# Patient Record
Sex: FEMALE | Employment: FULL TIME | URBAN - METROPOLITAN AREA
[De-identification: names, ages, dates, MRNs, and addresses within clinical notes are randomized per-mention and may not be internally consistent; named-entity substitution may affect disease eponyms.]

---

## 2017-01-16 ENCOUNTER — HOSPITAL ENCOUNTER (OUTPATIENT)
Facility: MEDICAL CENTER | Age: 42
End: 2017-01-16
Attending: OBSTETRICS & GYNECOLOGY
Payer: COMMERCIAL

## 2017-01-16 LAB
ALBUMIN SERPL BCP-MCNC: 4.5 G/DL (ref 3.2–4.9)
ALBUMIN/GLOB SERPL: 1.7 G/DL
ALP SERPL-CCNC: 35 U/L (ref 30–99)
ALT SERPL-CCNC: 17 U/L (ref 2–50)
ANION GAP SERPL CALC-SCNC: 7 MMOL/L (ref 0–11.9)
AST SERPL-CCNC: 16 U/L (ref 12–45)
BILIRUB SERPL-MCNC: 0.7 MG/DL (ref 0.1–1.5)
BUN SERPL-MCNC: 12 MG/DL (ref 8–22)
CALCIUM SERPL-MCNC: 9.2 MG/DL (ref 8.5–10.5)
CHLORIDE SERPL-SCNC: 105 MMOL/L (ref 96–112)
CHOLEST SERPL-MCNC: 186 MG/DL (ref 100–199)
CO2 SERPL-SCNC: 26 MMOL/L (ref 20–33)
CREAT SERPL-MCNC: 0.81 MG/DL (ref 0.5–1.4)
GLOBULIN SER CALC-MCNC: 2.7 G/DL (ref 1.9–3.5)
GLUCOSE SERPL-MCNC: 89 MG/DL (ref 65–99)
HDLC SERPL-MCNC: 79 MG/DL
LDLC SERPL CALC-MCNC: 90 MG/DL
POTASSIUM SERPL-SCNC: 3.8 MMOL/L (ref 3.6–5.5)
PROLACTIN SERPL-MCNC: 17.55 NG/ML (ref 2.8–26)
PROT SERPL-MCNC: 7.2 G/DL (ref 6–8.2)
SODIUM SERPL-SCNC: 138 MMOL/L (ref 135–145)
TRIGL SERPL-MCNC: 83 MG/DL (ref 0–149)
TSH SERPL DL<=0.005 MIU/L-ACNC: 4.72 UIU/ML (ref 0.3–3.7)

## 2017-01-16 PROCEDURE — 80053 COMPREHEN METABOLIC PANEL: CPT

## 2017-01-16 PROCEDURE — 80061 LIPID PANEL: CPT

## 2017-01-16 PROCEDURE — 84443 ASSAY THYROID STIM HORMONE: CPT

## 2017-01-16 PROCEDURE — 84146 ASSAY OF PROLACTIN: CPT

## 2017-02-01 ENCOUNTER — HOSPITAL ENCOUNTER (OUTPATIENT)
Facility: MEDICAL CENTER | Age: 42
End: 2017-02-01
Attending: NURSE PRACTITIONER
Payer: COMMERCIAL

## 2017-02-01 PROCEDURE — 87624 HPV HI-RISK TYP POOLED RSLT: CPT

## 2017-02-01 PROCEDURE — 88175 CYTOPATH C/V AUTO FLUID REDO: CPT

## 2017-02-02 PROCEDURE — 87624 HPV HI-RISK TYP POOLED RSLT: CPT

## 2017-02-03 LAB
CYTOLOGY REG CYTOL: ABNORMAL
HPV HR 12 DNA CVX QL NAA+PROBE: POSITIVE
HPV16 DNA SPEC QL NAA+PROBE: NEGATIVE
HPV18 DNA SPEC QL NAA+PROBE: NEGATIVE
SPECIMEN SOURCE: ABNORMAL

## 2017-09-08 ENCOUNTER — HOSPITAL ENCOUNTER (OUTPATIENT)
Dept: LAB | Facility: MEDICAL CENTER | Age: 42
End: 2017-09-08
Attending: OBSTETRICS & GYNECOLOGY
Payer: COMMERCIAL

## 2017-09-08 PROCEDURE — 83520 IMMUNOASSAY QUANT NOS NONAB: CPT

## 2017-09-11 LAB — MIS SERPL-MCNC: 0.3 NG/ML (ref 0.18–11.71)

## 2017-11-01 ENCOUNTER — HOSPITAL ENCOUNTER (OUTPATIENT)
Dept: LAB | Facility: MEDICAL CENTER | Age: 42
End: 2017-11-01
Attending: OBSTETRICS & GYNECOLOGY
Payer: COMMERCIAL

## 2017-11-01 LAB
HBV SURFACE AB SERPL IA-ACNC: 373.94 MIU/ML (ref 0–10)
HBV SURFACE AG SER QL: NEGATIVE
HCV AB SER QL: NEGATIVE
HIV 1+2 AB+HIV1 P24 AG SERPL QL IA: NON REACTIVE
TREPONEMA PALLIDUM IGG+IGM AB [PRESENCE] IN SERUM OR PLASMA BY IMMUNOASSAY: NON REACTIVE
TSH SERPL DL<=0.005 MIU/L-ACNC: 1.9 UIU/ML (ref 0.3–3.7)

## 2017-11-01 PROCEDURE — 87340 HEPATITIS B SURFACE AG IA: CPT

## 2017-11-01 PROCEDURE — 87389 HIV-1 AG W/HIV-1&-2 AB AG IA: CPT

## 2017-11-01 PROCEDURE — 87591 N.GONORRHOEAE DNA AMP PROB: CPT

## 2017-11-01 PROCEDURE — 84443 ASSAY THYROID STIM HORMONE: CPT

## 2017-11-01 PROCEDURE — 86803 HEPATITIS C AB TEST: CPT

## 2017-11-01 PROCEDURE — 87491 CHLMYD TRACH DNA AMP PROBE: CPT

## 2017-11-01 PROCEDURE — 86780 TREPONEMA PALLIDUM: CPT

## 2017-11-01 PROCEDURE — 86706 HEP B SURFACE ANTIBODY: CPT

## 2017-11-02 LAB
C TRACH DNA SPEC QL NAA+PROBE: NEGATIVE
N GONORRHOEA DNA SPEC QL NAA+PROBE: NEGATIVE
SPECIMEN SOURCE: NORMAL

## 2018-06-20 ENCOUNTER — HOSPITAL ENCOUNTER (OUTPATIENT)
Dept: LAB | Facility: MEDICAL CENTER | Age: 43
End: 2018-06-20
Attending: NURSE PRACTITIONER
Payer: COMMERCIAL

## 2018-06-20 PROCEDURE — 87591 N.GONORRHOEAE DNA AMP PROB: CPT

## 2018-06-20 PROCEDURE — 87624 HPV HI-RISK TYP POOLED RSLT: CPT

## 2018-06-20 PROCEDURE — 87491 CHLMYD TRACH DNA AMP PROBE: CPT

## 2018-06-20 PROCEDURE — 88175 CYTOPATH C/V AUTO FLUID REDO: CPT

## 2018-06-22 LAB — AMBIGUOUS DTTM AMBI4: NORMAL

## 2018-06-26 LAB
C TRACH DNA GENITAL QL NAA+PROBE: NEGATIVE
CYTOLOGY REG CYTOL: ABNORMAL
HPV HR 12 DNA CVX QL NAA+PROBE: POSITIVE
HPV16 DNA SPEC QL NAA+PROBE: NEGATIVE
HPV18 DNA SPEC QL NAA+PROBE: NEGATIVE
N GONORRHOEA DNA GENITAL QL NAA+PROBE: NEGATIVE
SPECIMEN SOURCE: ABNORMAL
SPECIMEN SOURCE: ABNORMAL

## 2018-09-12 ENCOUNTER — HOSPITAL ENCOUNTER (OUTPATIENT)
Dept: RADIOLOGY | Facility: MEDICAL CENTER | Age: 43
End: 2018-09-12
Attending: NURSE PRACTITIONER
Payer: COMMERCIAL

## 2018-09-12 DIAGNOSIS — Z12.31 BREAST CANCER SCREENING BY MAMMOGRAM: ICD-10-CM

## 2018-09-12 PROCEDURE — 77067 SCR MAMMO BI INCL CAD: CPT

## 2019-10-23 ENCOUNTER — HOSPITAL ENCOUNTER (OUTPATIENT)
Dept: RADIOLOGY | Facility: MEDICAL CENTER | Age: 44
End: 2019-10-23
Attending: NURSE PRACTITIONER
Payer: COMMERCIAL

## 2019-10-23 DIAGNOSIS — Z12.39 BREAST SCREENING: ICD-10-CM

## 2019-10-23 PROCEDURE — 77063 BREAST TOMOSYNTHESIS BI: CPT

## 2020-06-15 ENCOUNTER — APPOINTMENT (RX ONLY)
Dept: URBAN - METROPOLITAN AREA CLINIC 20 | Facility: CLINIC | Age: 45
Setting detail: DERMATOLOGY
End: 2020-06-15

## 2020-06-15 DIAGNOSIS — Z41.9 ENCOUNTER FOR PROCEDURE FOR PURPOSES OTHER THAN REMEDYING HEALTH STATE, UNSPECIFIED: ICD-10-CM

## 2020-06-15 PROCEDURE — ? SCITON BBL

## 2020-06-15 ASSESSMENT — LOCATION DETAILED DESCRIPTION DERM
LOCATION DETAILED: LEFT FOREHEAD
LOCATION DETAILED: LEFT INFERIOR CENTRAL MALAR CHEEK

## 2020-06-15 ASSESSMENT — LOCATION ZONE DERM: LOCATION ZONE: FACE

## 2020-06-15 ASSESSMENT — LOCATION SIMPLE DESCRIPTION DERM
LOCATION SIMPLE: LEFT FOREHEAD
LOCATION SIMPLE: LEFT CHEEK

## 2020-06-15 NOTE — PROCEDURE: SCITON BBL
Post Procedure Text: The patient tolerated the procedure well. Ice-chilled washclothes were applied to the treatment area for comfort. Post care was reviewed with the patient.
Repetition Rate (Hz): 2
Pulse Duration Units: milliseconds
Hide Repetition Rate?: No
Cooling (In C): 15
Fluence (J/Cm2): 12
Repetition Rate (Hz): 3
Cooling (In C): 20
Pulse Duration: 10
Detail Level: Zone
Fluence (J/Cm2): 8
Spot Size: Finesse Adapter Size: 15 x 15 mm square
Passes: 1
Spot Size: Finesse Adapter Size: 15 x 45 mm (No Finesse Adapter)
Cooling (In C): 22
Fluence (J/Cm2): 17
Price (Use Numbers Only, No Special Characters Or $): 350.00
Fluence (J/Cm2): 21
Consent: Written consent obtained, risks reviewed including but not limited to crusting, scabbing, blistering, scarring, darker or lighter pigmentary change, bruising, and/or incomplete response.
Anesthesia Volume In Cc: 0
Cooling ?: Yes
Fluence (J/Cm2): 9
Cooling (In C): 25
Post-Care Instructions: I reviewed with the patient in detail post-care instructions. Patient should stay away from the sun and wear sun protection until treated areas are fully healed.
Preprocedure Text: The treatment areas were thoroughly cleaned. Any exposed at risk hair that was not to be treated was covered in white paper tape. Clear ultrasound gel was applied to the treatment area. The area was treated with no immediate stacking of pulses.

## 2022-03-10 ENCOUNTER — APPOINTMENT (RX ONLY)
Dept: URBAN - METROPOLITAN AREA CLINIC 4 | Facility: CLINIC | Age: 47
Setting detail: DERMATOLOGY
End: 2022-03-10

## 2022-03-10 DIAGNOSIS — L81.4 OTHER MELANIN HYPERPIGMENTATION: ICD-10-CM

## 2022-03-10 DIAGNOSIS — L82.1 OTHER SEBORRHEIC KERATOSIS: ICD-10-CM

## 2022-03-10 DIAGNOSIS — L91.8 OTHER HYPERTROPHIC DISORDERS OF THE SKIN: ICD-10-CM

## 2022-03-10 DIAGNOSIS — D18.0 HEMANGIOMA: ICD-10-CM

## 2022-03-10 DIAGNOSIS — D22 MELANOCYTIC NEVI: ICD-10-CM

## 2022-03-10 DIAGNOSIS — R22.9 LOCALIZED SWELLING, MASS AND LUMP, UNSPECIFIED: ICD-10-CM

## 2022-03-10 DIAGNOSIS — Z71.89 OTHER SPECIFIED COUNSELING: ICD-10-CM

## 2022-03-10 PROBLEM — D22.72 MELANOCYTIC NEVI OF LEFT LOWER LIMB, INCLUDING HIP: Status: ACTIVE | Noted: 2022-03-10

## 2022-03-10 PROBLEM — D22.61 MELANOCYTIC NEVI OF RIGHT UPPER LIMB, INCLUDING SHOULDER: Status: ACTIVE | Noted: 2022-03-10

## 2022-03-10 PROBLEM — D22.62 MELANOCYTIC NEVI OF LEFT UPPER LIMB, INCLUDING SHOULDER: Status: ACTIVE | Noted: 2022-03-10

## 2022-03-10 PROBLEM — D22.5 MELANOCYTIC NEVI OF TRUNK: Status: ACTIVE | Noted: 2022-03-10

## 2022-03-10 PROBLEM — D18.01 HEMANGIOMA OF SKIN AND SUBCUTANEOUS TISSUE: Status: ACTIVE | Noted: 2022-03-10

## 2022-03-10 PROBLEM — D22.71 MELANOCYTIC NEVI OF RIGHT LOWER LIMB, INCLUDING HIP: Status: ACTIVE | Noted: 2022-03-10

## 2022-03-10 PROCEDURE — 99213 OFFICE O/P EST LOW 20 MIN: CPT

## 2022-03-10 PROCEDURE — ? COUNSELING

## 2022-03-10 ASSESSMENT — LOCATION ZONE DERM
LOCATION ZONE: EYELID
LOCATION ZONE: FACE
LOCATION ZONE: ARM
LOCATION ZONE: TRUNK
LOCATION ZONE: LEG

## 2022-03-10 ASSESSMENT — LOCATION DETAILED DESCRIPTION DERM
LOCATION DETAILED: INFERIOR THORACIC SPINE
LOCATION DETAILED: RIGHT ANTERIOR PROXIMAL UPPER ARM
LOCATION DETAILED: LEFT POPLITEAL SKIN
LOCATION DETAILED: LEFT SUPERIOR MEDIAL MIDBACK
LOCATION DETAILED: RIGHT VENTRAL PROXIMAL FOREARM
LOCATION DETAILED: LEFT ANTERIOR PROXIMAL UPPER ARM
LOCATION DETAILED: RIGHT BUTTOCK
LOCATION DETAILED: XIPHOID
LOCATION DETAILED: RIGHT LATERAL INFERIOR EYELID
LOCATION DETAILED: LEFT INFERIOR LATERAL MIDBACK
LOCATION DETAILED: RIGHT SUPERIOR MEDIAL MALAR CHEEK
LOCATION DETAILED: LEFT ANTERIOR DISTAL UPPER ARM
LOCATION DETAILED: LEFT BUTTOCK
LOCATION DETAILED: RIGHT ANTERIOR DISTAL UPPER ARM
LOCATION DETAILED: LEFT DISTAL POSTERIOR THIGH
LOCATION DETAILED: LEFT VENTRAL PROXIMAL FOREARM
LOCATION DETAILED: LEFT MEDIAL BREAST 7-8:00 REGION
LOCATION DETAILED: LEFT INFERIOR MEDIAL UPPER BACK
LOCATION DETAILED: LEFT VENTRAL LATERAL PROXIMAL FOREARM
LOCATION DETAILED: RIGHT MEDIAL SUPERIOR CHEST
LOCATION DETAILED: RIGHT PROXIMAL PRETIBIAL REGION
LOCATION DETAILED: RIGHT LATERAL BREAST 8-9:00 REGION
LOCATION DETAILED: RIGHT INFERIOR CENTRAL MALAR CHEEK
LOCATION DETAILED: LEFT LATERAL PROXIMAL PRETIBIAL REGION
LOCATION DETAILED: SUBXIPHOID
LOCATION DETAILED: RIGHT POPLITEAL SKIN
LOCATION DETAILED: LEFT PROXIMAL PRETIBIAL REGION
LOCATION DETAILED: RIGHT DISTAL POSTERIOR THIGH
LOCATION DETAILED: RIGHT ANTECUBITAL SKIN

## 2022-03-10 ASSESSMENT — LOCATION SIMPLE DESCRIPTION DERM
LOCATION SIMPLE: RIGHT PRETIBIAL REGION
LOCATION SIMPLE: LEFT BREAST
LOCATION SIMPLE: LEFT PRETIBIAL REGION
LOCATION SIMPLE: LEFT LOWER BACK
LOCATION SIMPLE: UPPER BACK
LOCATION SIMPLE: RIGHT BREAST
LOCATION SIMPLE: ABDOMEN
LOCATION SIMPLE: CHEST
LOCATION SIMPLE: RIGHT POSTERIOR THIGH
LOCATION SIMPLE: RIGHT POPLITEAL SKIN
LOCATION SIMPLE: LEFT FOREARM
LOCATION SIMPLE: RIGHT BUTTOCK
LOCATION SIMPLE: LEFT POSTERIOR THIGH
LOCATION SIMPLE: RIGHT CHEEK
LOCATION SIMPLE: LEFT BUTTOCK
LOCATION SIMPLE: LEFT UPPER ARM
LOCATION SIMPLE: RIGHT INFERIOR EYELID
LOCATION SIMPLE: RIGHT FOREARM
LOCATION SIMPLE: LEFT POPLITEAL SKIN
LOCATION SIMPLE: RIGHT UPPER ARM
LOCATION SIMPLE: LEFT UPPER BACK

## 2022-03-10 NOTE — PROCEDURE: COUNSELING
Detail Level: Zone
Patient Specific Counseling (Will Not Stick From Patient To Patient): Recommend lasers especially on the chest.
Detail Level: Detailed
Patient Specific Counseling (Will Not Stick From Patient To Patient): Recommend lasers.

## 2024-02-15 ENCOUNTER — APPOINTMENT (RX ONLY)
Dept: URBAN - METROPOLITAN AREA CLINIC 4 | Facility: CLINIC | Age: 49
Setting detail: DERMATOLOGY
End: 2024-02-15

## 2024-02-15 DIAGNOSIS — D485 NEOPLASM OF UNCERTAIN BEHAVIOR OF SKIN: ICD-10-CM

## 2024-02-15 DIAGNOSIS — L82.0 INFLAMED SEBORRHEIC KERATOSIS: ICD-10-CM

## 2024-02-15 PROBLEM — D48.5 NEOPLASM OF UNCERTAIN BEHAVIOR OF SKIN: Status: ACTIVE | Noted: 2024-02-15

## 2024-02-15 PROCEDURE — ? DIAGNOSIS COMMENT

## 2024-02-15 PROCEDURE — 17110 DESTRUCTION B9 LES UP TO 14: CPT

## 2024-02-15 PROCEDURE — ? COUNSELING

## 2024-02-15 PROCEDURE — 99212 OFFICE O/P EST SF 10 MIN: CPT | Mod: 25

## 2024-02-15 PROCEDURE — ? LIQUID NITROGEN

## 2024-02-15 ASSESSMENT — LOCATION DETAILED DESCRIPTION DERM
LOCATION DETAILED: LEFT SUPERIOR FOREHEAD
LOCATION DETAILED: RIGHT INFERIOR LID MARGIN

## 2024-02-15 ASSESSMENT — LOCATION ZONE DERM
LOCATION ZONE: EYELID
LOCATION ZONE: FACE

## 2024-02-15 ASSESSMENT — LOCATION SIMPLE DESCRIPTION DERM
LOCATION SIMPLE: RIGHT INFERIOR EYELID
LOCATION SIMPLE: LEFT FOREHEAD

## 2024-02-15 NOTE — PROCEDURE: DIAGNOSIS COMMENT
Render Risk Assessment In Note?: no
Comment: Referring to Dr. Ann for evaluation and management, possibly removal
Detail Level: Simple

## 2024-02-15 NOTE — PROCEDURE: LIQUID NITROGEN
Spray Paint Technique: No
Spray Paint Text: The liquid nitrogen was applied to the skin utilizing a spray paint frosting technique.
Medical Necessity Information: It is in your best interest to select a reason for this procedure from the list below. All of these items fulfill various CMS LCD requirements except the new and changing color options.
Show Topical Anesthesia Variable?: Yes
Consent: The patient's consent was obtained including but not limited to risks of crusting, scabbing, blistering, scarring, darker or lighter pigmentary change, recurrence, incomplete removal and infection.
Medical Necessity Clause: This procedure was medically necessary because the lesions that were treated were:
Detail Level: Detailed
Post-Care Instructions: I reviewed with the patient in detail post-care instructions. Patient is to wear sunprotection, and avoid picking at any of the treated lesions. Pt may apply Vaseline to crusted or scabbing areas.

## 2024-02-22 ENCOUNTER — OFFICE VISIT (OUTPATIENT)
Dept: URGENT CARE | Facility: CLINIC | Age: 49
End: 2024-02-22
Payer: COMMERCIAL

## 2024-02-22 ENCOUNTER — APPOINTMENT (OUTPATIENT)
Dept: RADIOLOGY | Facility: IMAGING CENTER | Age: 49
End: 2024-02-22
Attending: NURSE PRACTITIONER
Payer: COMMERCIAL

## 2024-02-22 VITALS
HEART RATE: 85 BPM | HEIGHT: 67 IN | OXYGEN SATURATION: 99 % | SYSTOLIC BLOOD PRESSURE: 108 MMHG | BODY MASS INDEX: 20.4 KG/M2 | DIASTOLIC BLOOD PRESSURE: 70 MMHG | RESPIRATION RATE: 16 BRPM | WEIGHT: 130 LBS | TEMPERATURE: 97.4 F

## 2024-02-22 DIAGNOSIS — R00.2 PALPITATIONS: ICD-10-CM

## 2024-02-22 DIAGNOSIS — I49.8 SINUS ARRHYTHMIA SEEN ON ELECTROCARDIOGRAM: ICD-10-CM

## 2024-02-22 PROCEDURE — 3078F DIAST BP <80 MM HG: CPT | Performed by: NURSE PRACTITIONER

## 2024-02-22 PROCEDURE — 3074F SYST BP LT 130 MM HG: CPT | Performed by: NURSE PRACTITIONER

## 2024-02-22 PROCEDURE — 99204 OFFICE O/P NEW MOD 45 MIN: CPT | Performed by: NURSE PRACTITIONER

## 2024-02-22 PROCEDURE — 71046 X-RAY EXAM CHEST 2 VIEWS: CPT | Mod: TC | Performed by: NURSE PRACTITIONER

## 2024-02-22 RX ORDER — VALACYCLOVIR HYDROCHLORIDE 1 G/1
TABLET, FILM COATED ORAL
COMMUNITY
Start: 2023-12-18

## 2024-02-22 RX ORDER — FLUCONAZOLE 150 MG/1
TABLET ORAL
COMMUNITY
Start: 2023-12-18 | End: 2024-03-01

## 2024-02-22 RX ORDER — DEXTROAMPHETAMINE SACCHARATE, AMPHETAMINE ASPARTATE, DEXTROAMPHETAMINE SULFATE AND AMPHETAMINE SULFATE 2.5; 2.5; 2.5; 2.5 MG/1; MG/1; MG/1; MG/1
10 TABLET ORAL 3 TIMES DAILY
COMMUNITY
Start: 2024-02-09

## 2024-02-22 ASSESSMENT — ENCOUNTER SYMPTOMS
SHORTNESS OF BREATH: 0
DIZZINESS: 0
PALPITATIONS: 1
NERVOUS/ANXIOUS: 0

## 2024-02-22 NOTE — PATIENT INSTRUCTIONS
Follow up with PCP. Follow up emergently for increased or persistent chest pain, persistent palpitations, dizziness, syncope, weakness, or nausea or vomiting.

## 2024-02-22 NOTE — PROGRESS NOTES
Subjective:     Mirella West is a 48 y.o. female who presents for Palpitations (X2 months)      Presents for new onset  palpitations, which have not notable correlated to anything specific. States they started a couple months ago. Symptoms are brief. Denies taking any new stimulants. No changes in caffeine intake. No diet medications. Has been on Adderall for several years, stating she takes it PRN. No reported cardiovascular risk factors. No hx of HTN. Her TSH was reportedly WNL last year.     Palpitations   This is a new problem. The current episode started more than 1 month ago. Nothing aggravates the symptoms. Pertinent negatives include no anxiety, dizziness or shortness of breath. She has tried nothing for the symptoms. There is no history of anemia, anxiety, heart disease or hyperthyroidism.       No past medical history on file.    Past Surgical History:   Procedure Laterality Date    PB ENLARGE BREAST WITH IMPLANT         Social History     Socioeconomic History    Marital status: Unknown     Spouse name: Not on file    Number of children: Not on file    Years of education: Not on file    Highest education level: Not on file   Occupational History    Not on file   Tobacco Use    Smoking status: Not on file    Smokeless tobacco: Not on file   Substance and Sexual Activity    Alcohol use: Not on file    Drug use: Not on file    Sexual activity: Not on file   Other Topics Concern    Not on file   Social History Narrative    Not on file     Social Determinants of Health     Financial Resource Strain: Not on file   Food Insecurity: Not on file   Transportation Needs: Not on file   Physical Activity: Not on file   Stress: Not on file   Social Connections: Not on file   Intimate Partner Violence: Not on file   Housing Stability: Not on file        No family history on file.     No Known Allergies    Review of Systems   Respiratory:  Negative for shortness of breath.    Cardiovascular:  Positive for  "palpitations. Negative for leg swelling.   Neurological:  Negative for dizziness.   Psychiatric/Behavioral:  The patient is not nervous/anxious.    All other systems reviewed and are negative.       Objective:   /70 (BP Location: Left arm, Patient Position: Sitting, BP Cuff Size: Adult)   Pulse 85   Temp 36.3 °C (97.4 °F) (Temporal)   Resp 16   Ht 1.702 m (5' 7\")   Wt 59 kg (130 lb)   SpO2 99%   BMI 20.36 kg/m²     Physical Exam  Vitals reviewed.   Constitutional:       General: She is not in acute distress.     Appearance: She is not toxic-appearing.   Cardiovascular:      Rate and Rhythm: Normal rate. Rhythm irregular.   Pulmonary:      Effort: Pulmonary effort is normal. No respiratory distress.      Breath sounds: Normal breath sounds. No stridor. No wheezing, rhonchi or rales.   Skin:     General: Skin is warm and dry.   Neurological:      Mental Status: She is alert and oriented to person, place, and time.         Assessment/Plan:   1. Palpitations  - EKG - Clinic Performed  - TSH; Future  - CBC WITHOUT DIFFERENTIAL; Future  - Comp Metabolic Panel; Future  - MAGNESIUM; Future  - DX-CHEST-2 VIEWS; Future  - REFERRAL TO CARDIOLOGY    2. Sinus arrhythmia seen on electrocardiogram  - TSH; Future  - CBC WITHOUT DIFFERENTIAL; Future  - Comp Metabolic Panel; Future  - MAGNESIUM; Future  - DX-CHEST-2 VIEWS; Future  - REFERRAL TO CARDIOLOGY  DX-CHEST-2 VIEWS    Result Date: 2/22/2024 2/22/2024 1:25 PM HISTORY/REASON FOR EXAM: . Palpitations TECHNIQUE/EXAM DESCRIPTION AND NUMBER OF VIEWS: Two views of the chest. COMPARISON:  None. FINDINGS: The heart is normal in size. No pulmonary infiltrates or consolidations are noted. No pleural effusions are appreciated.     No active disease.    Follow up with PCP. Follow up emergently for increased or persistent chest pain, persistent palpitations, dizziness, syncope, weakness, or nausea or vomiting.    -Takes Adderall PRN, denies changes in medication dosage. Has " tolerated the medication previously. Low risk for ACS based on risk assessment and symptoms. No ST elevation. Advised initiating baseline labs, with cardiology follow up. The patient does have an established PCP. Given ED precautions for new or worsening symptoms.     Differential diagnosis, natural history, supportive care, and indications for immediate follow-up discussed.

## 2024-03-01 ENCOUNTER — OFFICE VISIT (OUTPATIENT)
Dept: CARDIOLOGY | Facility: MEDICAL CENTER | Age: 49
End: 2024-03-01
Attending: INTERNAL MEDICINE
Payer: COMMERCIAL

## 2024-03-01 VITALS
SYSTOLIC BLOOD PRESSURE: 110 MMHG | HEART RATE: 73 BPM | RESPIRATION RATE: 18 BRPM | DIASTOLIC BLOOD PRESSURE: 80 MMHG | WEIGHT: 128 LBS | OXYGEN SATURATION: 99 % | BODY MASS INDEX: 20.09 KG/M2 | HEIGHT: 67 IN

## 2024-03-01 DIAGNOSIS — I49.3 PVC'S (PREMATURE VENTRICULAR CONTRACTIONS): ICD-10-CM

## 2024-03-01 LAB — AMBIG TEST ORDER  977174: NORMAL

## 2024-03-01 PROCEDURE — 3074F SYST BP LT 130 MM HG: CPT | Performed by: INTERNAL MEDICINE

## 2024-03-01 PROCEDURE — 3079F DIAST BP 80-89 MM HG: CPT | Performed by: INTERNAL MEDICINE

## 2024-03-01 PROCEDURE — 99204 OFFICE O/P NEW MOD 45 MIN: CPT | Performed by: INTERNAL MEDICINE

## 2024-03-01 PROCEDURE — 99202 OFFICE O/P NEW SF 15 MIN: CPT | Performed by: INTERNAL MEDICINE

## 2024-03-01 ASSESSMENT — FIBROSIS 4 INDEX: FIB4 SCORE: 0.96

## 2024-03-01 NOTE — PROGRESS NOTES
"    Interventional cardiology Initial Consultation Note      Chief Complaint: Palpitations    Mirella West is a 48 y.o. female  patient presented today with complaints of palpitations feels like thumping, skipping a beat.  No chest pain, shortness of breath.  But she can feel them sometimes all day, disruptive to her day-to-day activities.        History reviewed. No pertinent past medical history.          Current Outpatient Medications   Medication Sig Dispense Refill    metoprolol tartrate (LOPRESSOR) 25 MG Tab Take 0.5 Tablets by mouth 2 times a day. 60 Tablet 5    amphetamine-dextroamphetamine (ADDERALL) 10 MG Tab Take 10 mg by mouth 3 times a day.      valacyclovir (VALTREX) 1 GM Tab       fluconazole (DIFLUCAN) 150 MG tablet  (Patient not taking: Reported on 3/1/2024)       No current facility-administered medications for this visit.             Physical Exam:  Ambulatory Vitals  /80 (BP Location: Left arm, Patient Position: Sitting, BP Cuff Size: Adult)   Pulse 73   Resp 18   Ht 1.702 m (5' 7\")   Wt 58.1 kg (128 lb)   SpO2 99%    Oxygen Therapy:  Pulse Oximetry: 99 %  BP Readings from Last 4 Encounters:   03/01/24 110/80   02/22/24 108/70       Weight/BMI: Body mass index is 20.05 kg/m².  Wt Readings from Last 4 Encounters:   03/01/24 58.1 kg (128 lb)   02/22/24 59 kg (130 lb)           General: Well appearing and in no apparent distress  Neck: carotid bruits absent  Lungs: respiratory sounds  normal  Heart: Regular rhythm,  No palpable thrills on palpation, murmurs absent, no rubs,   Lower extremity edema absent.     EKG sinus rhythm, PVC      Medical Decision Making:  Problem List Items Addressed This Visit    None  Visit Diagnoses       PVC's (premature ventricular contractions)        Relevant Medications    metoprolol tartrate (LOPRESSOR) 25 MG Tab    Other Relevant Orders    EC-ECHOCARDIOGRAM COMPLETE W/O CONT          48-year-old female patient with complaints of palpitations due to " PVCs.  Conservative measures discussed like hydration, avoiding coffee.  Will try beta-blocker metoprolol 12.5 mg twice daily.  Will obtain an echocardiogram to rule out structural heart disease.        This note was dictated using Dragon speech recognition software.    Donnie AMES  Interventional cardiologist  [facility] [address]  [address] [address]  Phone: [phone]  Fax: [phone]

## 2024-03-05 LAB
CA-I SERPL ISE-MCNC: NORMAL MG/DL
REQUEST PROBLEM   100875: NORMAL
WRITTEN AUTHORIZATION   977900: NORMAL

## 2024-03-06 LAB
ALBUMIN SERPL-MCNC: 4.8 G/DL (ref 3.9–4.9)
ALBUMIN/GLOB SERPL: 1.8 {RATIO} (ref 1.2–2.2)
ALP SERPL-CCNC: 55 IU/L (ref 44–121)
ALT SERPL-CCNC: 17 IU/L (ref 0–32)
AST SERPL-CCNC: 22 IU/L (ref 0–40)
BILIRUB SERPL-MCNC: 0.5 MG/DL (ref 0–1.2)
BUN SERPL-MCNC: 13 MG/DL (ref 6–24)
BUN/CREAT SERPL: 17 (ref 9–23)
CALCIUM SERPL-MCNC: 9.8 MG/DL (ref 8.7–10.2)
CHLORIDE SERPL-SCNC: 102 MMOL/L (ref 96–106)
CHOLEST SERPL-MCNC: 212 MG/DL (ref 100–199)
CO2 SERPL-SCNC: 17 MMOL/L (ref 20–29)
CREAT SERPL-MCNC: 0.77 MG/DL (ref 0.57–1)
EGFRCR SERPLBLD CKD-EPI 2021: 96 ML/MIN/1.73
ERYTHROCYTE [DISTWIDTH] IN BLOOD BY AUTOMATED COUNT: 12 % (ref 11.7–15.4)
GLOBULIN SER CALC-MCNC: 2.6 G/DL (ref 1.5–4.5)
GLUCOSE SERPL-MCNC: ABNORMAL MG/DL
HCT VFR BLD AUTO: 43.3 % (ref 34–46.6)
HDLC SERPL-MCNC: 91 MG/DL
HGB BLD-MCNC: 14.2 G/DL (ref 11.1–15.9)
LDLC SERPL CALC-MCNC: 106 MG/DL (ref 0–99)
MAGNESIUM SERPL-MCNC: 2.1 MG/DL (ref 1.6–2.3)
MCH RBC QN AUTO: 33.2 PG (ref 26.6–33)
MCHC RBC AUTO-ENTMCNC: 32.8 G/DL (ref 31.5–35.7)
MCV RBC AUTO: 101 FL (ref 79–97)
MIS SERPL-MCNC: 0.25 NG/ML
PLATELET # BLD AUTO: 256 X10E3/UL (ref 150–450)
PLEASE NOTE   199999: NORMAL
POTASSIUM SERPL-SCNC: ABNORMAL MMOL/L
PROT SERPL-MCNC: 7.4 G/DL (ref 6–8.5)
RBC # BLD AUTO: 4.28 X10E6/UL (ref 3.77–5.28)
SODIUM SERPL-SCNC: 138 MMOL/L (ref 134–144)
TRIGL SERPL-MCNC: 84 MG/DL (ref 0–149)
TSH SERPL DL<=0.005 MIU/L-ACNC: 3.11 UIU/ML (ref 0.45–4.5)
VLDLC SERPL CALC-MCNC: 15 MG/DL (ref 5–40)
WBC # BLD AUTO: 4.8 X10E3/UL (ref 3.4–10.8)

## 2024-03-21 ENCOUNTER — PATIENT MESSAGE (OUTPATIENT)
Dept: CARDIOLOGY | Facility: MEDICAL CENTER | Age: 49
End: 2024-03-21
Payer: COMMERCIAL

## 2024-03-21 ENCOUNTER — TELEPHONE (OUTPATIENT)
Dept: CARDIOLOGY | Facility: MEDICAL CENTER | Age: 49
End: 2024-03-21
Payer: COMMERCIAL

## 2024-03-21 DIAGNOSIS — I49.3 PVC'S (PREMATURE VENTRICULAR CONTRACTIONS): ICD-10-CM

## 2024-03-21 NOTE — PATIENT COMMUNICATION
Donnie Snow M.D.  Zach now (4:07 PM)     Schedule earlier appointment for heart monitor.       To schedulers: Please reach out to schedule visit for heart monitor, thank you!

## 2024-03-21 NOTE — TELEPHONE ENCOUNTER
To AK: Patient called requesting a cardiac monitor. Not mentioned in recent office visit. Please advise if okay to order. Thank you!

## 2024-03-21 NOTE — PATIENT COMMUNICATION
To AK: Please see patient's message following up on the request for a cardiac event monitor. Thank you!

## 2024-03-21 NOTE — TELEPHONE ENCOUNTER
Lelo,    I recvd a voice message from this patient requesting to schedule a cardiac monitor. I do not see that Dr. Snow placed an order for a monitor for this patient. Can you please place an order for this monitor so they can get scheduled?    Thank You,  April

## 2024-03-22 NOTE — TELEPHONE ENCOUNTER
Donnie Snow M.D.  You55 minutes ago (4:24 PM)     Ok to order     Order placed in patient message encounter 3/21/24 and followed up with patient.

## 2024-03-27 ENCOUNTER — NON-PROVIDER VISIT (OUTPATIENT)
Dept: CARDIOLOGY | Facility: MEDICAL CENTER | Age: 49
End: 2024-03-27
Attending: INTERNAL MEDICINE
Payer: COMMERCIAL

## 2024-03-27 DIAGNOSIS — I49.3 PVC'S (PREMATURE VENTRICULAR CONTRACTIONS): ICD-10-CM

## 2024-03-27 NOTE — PROGRESS NOTES
Home enrollment completed in the 14 day Zio Holter monitor per Donnie Snow MD.  Monitor will be shipped to patient via [employer], pending EOS.

## 2024-04-12 ENCOUNTER — APPOINTMENT (OUTPATIENT)
Dept: CARDIOLOGY | Facility: MEDICAL CENTER | Age: 49
End: 2024-04-12
Attending: INTERNAL MEDICINE
Payer: COMMERCIAL

## 2024-04-18 ENCOUNTER — TELEPHONE (OUTPATIENT)
Dept: CARDIOLOGY | Facility: MEDICAL CENTER | Age: 49
End: 2024-04-18
Payer: COMMERCIAL

## 2024-04-18 NOTE — TELEPHONE ENCOUNTER
EOS noted in media dated today.     AK please see EOS in media dated today and read for final results. Thank you!

## 2024-04-19 ENCOUNTER — OFFICE VISIT (OUTPATIENT)
Dept: CARDIOLOGY | Facility: MEDICAL CENTER | Age: 49
End: 2024-04-19
Payer: COMMERCIAL

## 2024-04-19 VITALS
HEIGHT: 67 IN | BODY MASS INDEX: 20.44 KG/M2 | WEIGHT: 130.2 LBS | SYSTOLIC BLOOD PRESSURE: 106 MMHG | DIASTOLIC BLOOD PRESSURE: 78 MMHG | HEART RATE: 92 BPM | RESPIRATION RATE: 14 BRPM | OXYGEN SATURATION: 97 %

## 2024-04-19 DIAGNOSIS — R00.2 PALPITATIONS: ICD-10-CM

## 2024-04-19 PROCEDURE — 99214 OFFICE O/P EST MOD 30 MIN: CPT

## 2024-04-19 PROCEDURE — 3078F DIAST BP <80 MM HG: CPT

## 2024-04-19 PROCEDURE — 3074F SYST BP LT 130 MM HG: CPT

## 2024-04-19 PROCEDURE — 99211 OFF/OP EST MAY X REQ PHY/QHP: CPT

## 2024-04-19 RX ORDER — PROPRANOLOL HYDROCHLORIDE 10 MG/1
10 TABLET ORAL 2 TIMES DAILY
Qty: 60 TABLET | Refills: 11 | Status: SHIPPED | OUTPATIENT
Start: 2024-04-19

## 2024-04-19 RX ORDER — PROPRANOLOL HYDROCHLORIDE 10 MG/1
10 TABLET ORAL 2 TIMES DAILY
Qty: 60 TABLET | Refills: 11 | Status: SHIPPED
Start: 2024-04-19 | End: 2024-04-19

## 2024-04-19 ASSESSMENT — ENCOUNTER SYMPTOMS
NERVOUS/ANXIOUS: 0
MUSCULOSKELETAL NEGATIVE: 1
SHORTNESS OF BREATH: 0
EYES NEGATIVE: 1
CONSTITUTIONAL NEGATIVE: 1
DEPRESSION: 0
PALPITATIONS: 1
PND: 0
GASTROINTESTINAL NEGATIVE: 1
ORTHOPNEA: 0
NEUROLOGICAL NEGATIVE: 1

## 2024-04-19 ASSESSMENT — FIBROSIS 4 INDEX: FIB4 SCORE: 0.96

## 2024-04-19 NOTE — PROGRESS NOTES
Chief Complaint   Patient presents with    Follow-Up     PVC's (premature ventricular contractions)        Palpitations       Subjective     Mirella West is a 48 y.o. female who presents today for follow up.    They have a history of palpitations    Last seen by Dr. Snow on 3/1/2024, at that visit she was having palpitations which were disruptive to her day-to-day activities, EKG was done which showed PVCs.  She was started on metoprolol 12.5 mg twice daily and was recommended to complete an echocardiogram.  After that visit she called back requesting a cardiac event monitor due to worsening symptoms of shortness of breath.  Her cardiac event monitor did not show any significant arrhythmias or blocks, less than 1% PACs and PVCs    Today 4/19/2024 she continues to experience palpitations which are very bothersome for her    Activity: She stays very physically active with activity such as rafting and mountain biking      History reviewed. No pertinent past medical history.  Past Surgical History:   Procedure Laterality Date    NE BREAST AUGMENTATION WITH IMPLANT       History reviewed. No pertinent family history.  Social History     Socioeconomic History    Marital status: Unknown     Spouse name: Not on file    Number of children: Not on file    Years of education: Not on file    Highest education level: Not on file   Occupational History    Not on file   Tobacco Use    Smoking status: Never    Smokeless tobacco: Never   Substance and Sexual Activity    Alcohol use: Yes    Drug use: Never    Sexual activity: Not on file   Other Topics Concern    Not on file   Social History Narrative    Not on file     Social Determinants of Health     Financial Resource Strain: Not on file   Food Insecurity: Not on file   Transportation Needs: Not on file   Physical Activity: Not on file   Stress: Not on file   Social Connections: Not on file   Intimate Partner Violence: Not on file   Housing Stability: Not on file  "    No Known Allergies  Outpatient Encounter Medications as of 4/19/2024   Medication Sig Dispense Refill    propranolol (INDERAL) 10 MG Tab Take 1 Tablet by mouth 2 times a day. 60 Tablet 11    amphetamine-dextroamphetamine (ADDERALL) 10 MG Tab Take 10 mg by mouth 3 times a day.      valacyclovir (VALTREX) 1 GM Tab       [DISCONTINUED] metoprolol tartrate (LOPRESSOR) 25 MG Tab Take 0.5 Tablets by mouth 2 times a day. 60 Tablet 5     No facility-administered encounter medications on file as of 4/19/2024.     Review of Systems   Constitutional: Negative.    HENT: Negative.     Eyes: Negative.    Respiratory:  Negative for shortness of breath.    Cardiovascular:  Positive for palpitations. Negative for chest pain, orthopnea, leg swelling and PND.   Gastrointestinal: Negative.    Genitourinary: Negative.    Musculoskeletal: Negative.    Skin: Negative.    Neurological: Negative.    Endo/Heme/Allergies: Negative.    Psychiatric/Behavioral:  Negative for depression. The patient is not nervous/anxious.               Objective     /78 (BP Location: Left arm, Patient Position: Sitting, BP Cuff Size: Adult)   Pulse 92   Resp 14   Ht 1.702 m (5' 7\")   Wt 59.1 kg (130 lb 3.2 oz)   SpO2 97%   BMI 20.39 kg/m²     Physical Exam  Constitutional:       Appearance: Normal appearance.   HENT:      Head: Normocephalic and atraumatic.   Neck:      Vascular: No JVD.   Cardiovascular:      Rate and Rhythm: Normal rate and regular rhythm.      Pulses: Normal pulses.      Heart sounds: Normal heart sounds. No murmur heard.     No friction rub.   Pulmonary:      Effort: Pulmonary effort is normal. No respiratory distress.      Breath sounds: Normal breath sounds.   Abdominal:      General: There is no distension.      Palpations: Abdomen is soft.   Musculoskeletal:      Right lower leg: No edema.      Left lower leg: No edema.   Skin:     General: Skin is warm and dry.   Neurological:      General: No focal deficit present.      " Mental Status: She is alert and oriented to person, place, and time.   Psychiatric:         Mood and Affect: Mood normal.         Behavior: Behavior normal.            Lab Results   Component Value Date/Time    CHOLSTRLTOT 212 (H) 02/28/2024 09:00 PM    CHOLSTRLTOT 186 01/16/2017 07:00 AM    LDL 90 01/16/2017 07:00 AM    HDL 91 02/28/2024 09:00 PM    HDL 79 01/16/2017 07:00 AM    TRIGLYCERIDE 84 02/28/2024 09:00 PM    TRIGLYCERIDE 83 01/16/2017 07:00 AM       Lab Results   Component Value Date/Time    SODIUM 138 02/28/2024 09:00 PM    SODIUM 138 01/16/2017 07:00 AM    POTASSIUM CANCELED 02/28/2024 09:00 PM    POTASSIUM 3.8 01/16/2017 07:00 AM    CHLORIDE 102 02/28/2024 09:00 PM    CHLORIDE 105 01/16/2017 07:00 AM    CO2 17 (L) 02/28/2024 09:00 PM    CO2 26 01/16/2017 07:00 AM    GLUCOSE CANCELED 02/28/2024 09:00 PM    GLUCOSE 89 01/16/2017 07:00 AM    BUN 13 02/28/2024 09:00 PM    BUN 12 01/16/2017 07:00 AM    CREATININE 0.77 02/28/2024 09:00 PM    CREATININE 0.81 01/16/2017 07:00 AM    BUNCREATRAT 17 02/28/2024 09:00 PM     Lab Results   Component Value Date/Time    ALKPHOSPHAT 55 02/28/2024 09:00 PM    ALKPHOSPHAT 35 01/16/2017 07:00 AM    ASTSGOT 22 02/28/2024 09:00 PM    ASTSGOT 16 01/16/2017 07:00 AM    ALTSGPT 17 02/28/2024 09:00 PM    ALTSGPT 17 01/16/2017 07:00 AM    TBILIRUBIN 0.5 02/28/2024 09:00 PM    TBILIRUBIN 0.7 01/16/2017 07:00 AM      Cardiac event monitor 4/18/2024  ZIO XT REPORT (03/30/24-04/11/24)   Zio study showing predominately sinus rhythm with maximum rate of 165 bpm, minimum rate of 50 bpm, average of 85 bpm.   Atrial fibrillation: None.   Supraventricular tachycardia: 1 episode of supraventricular tachycardia/atrial tachycardia lasting 4 beats with rate of 141 bpm noted.   Pauses: None.   Heart block: None.   Ventricular tachycardia: None.   Rare <1% burden of premature atrial contractions, rare couplets.   Rare <1% burden of premature ventricular contractions, rare couplets.     Ventricular trigeminy (longest episode lasting 9.5 seconds) noted.   Patient events correlated with sinus rhythm, sinus tachycardia, premature ventricular contractions.       Assessment & Plan     1. Palpitations  propranolol (INDERAL) 10 MG Tab          Medical Decision Making: Today's Assessment/Status/Plan:        Palpitations  -Heart monitor without significant arrhythmia or block  -Less than 1% PAC/PVC burden  -She reports that the metoprolol has not been helpful, she is wanting to trial propranolol we will start with 10 mg twice daily, could increase up to 3 times daily if twice daily is ineffective  -She has an echocardiogram coming up on May 21    Recommend following up after her echocardiogram     This note was dictated using Dragon speech recognition software.

## 2024-05-21 ENCOUNTER — HOSPITAL ENCOUNTER (OUTPATIENT)
Dept: CARDIOLOGY | Facility: MEDICAL CENTER | Age: 49
End: 2024-05-21
Attending: INTERNAL MEDICINE
Payer: COMMERCIAL

## 2024-05-21 DIAGNOSIS — I49.3 PVC'S (PREMATURE VENTRICULAR CONTRACTIONS): ICD-10-CM

## 2024-05-22 LAB
LV EJECT FRACT  99904: 60
LV EJECT FRACT MOD 2C 99903: 55.94
LV EJECT FRACT MOD 4C 99902: 51.41
LV EJECT FRACT MOD BP 99901: 55.68

## 2024-05-22 PROCEDURE — 93306 TTE W/DOPPLER COMPLETE: CPT | Mod: 26 | Performed by: INTERNAL MEDICINE

## 2024-06-06 ENCOUNTER — OFFICE VISIT (OUTPATIENT)
Dept: CARDIOLOGY | Facility: MEDICAL CENTER | Age: 49
End: 2024-06-06
Payer: COMMERCIAL

## 2024-06-06 VITALS
BODY MASS INDEX: 20.88 KG/M2 | SYSTOLIC BLOOD PRESSURE: 100 MMHG | RESPIRATION RATE: 16 BRPM | OXYGEN SATURATION: 97 % | HEIGHT: 67 IN | HEART RATE: 71 BPM | DIASTOLIC BLOOD PRESSURE: 62 MMHG | WEIGHT: 133 LBS

## 2024-06-06 DIAGNOSIS — R00.2 PALPITATIONS: ICD-10-CM

## 2024-06-06 DIAGNOSIS — I25.3 PFO WITH ATRIAL SEPTAL ANEURYSM: ICD-10-CM

## 2024-06-06 DIAGNOSIS — Q21.12 PFO WITH ATRIAL SEPTAL ANEURYSM: ICD-10-CM

## 2024-06-06 PROCEDURE — 99214 OFFICE O/P EST MOD 30 MIN: CPT

## 2024-06-06 PROCEDURE — 3078F DIAST BP <80 MM HG: CPT

## 2024-06-06 PROCEDURE — 3074F SYST BP LT 130 MM HG: CPT

## 2024-06-06 PROCEDURE — 99211 OFF/OP EST MAY X REQ PHY/QHP: CPT

## 2024-06-06 ASSESSMENT — ENCOUNTER SYMPTOMS
EYES NEGATIVE: 1
PALPITATIONS: 1
SHORTNESS OF BREATH: 0
CONSTITUTIONAL NEGATIVE: 1
NERVOUS/ANXIOUS: 0
ORTHOPNEA: 0
MUSCULOSKELETAL NEGATIVE: 1
PND: 0
HEADACHES: 1
DEPRESSION: 0
GASTROINTESTINAL NEGATIVE: 1

## 2024-06-06 ASSESSMENT — FIBROSIS 4 INDEX: FIB4 SCORE: 0.96

## 2024-06-06 NOTE — PROGRESS NOTES
Chief Complaint   Patient presents with    Palpitations    Follow-Up     7 week follow up       Subjective     Mirella West is a 48 y.o. female who presents today  for follow up.     Seen by Dr. Snow on 3/1/2024, at that visit she was having palpitations which were disruptive to her day-to-day activities, EKG was done which showed PVCs.  She was started on metoprolol 12.5 mg twice daily and was recommended to complete an echocardiogram.  After that visit she called back requesting a cardiac event monitor due to worsening symptoms of shortness of breath.  Her cardiac event monitor did not show any significant arrhythmias or blocks, less than 1% PACs and PVCs     Seen by myself on 4/19/2024 she continues to experience palpitations which are very bothersome for her. Activity: She stays very physically active with activity such as rafting and mountain biking.  Will trial her on propranolol    History reviewed. No pertinent past medical history.  Past Surgical History:   Procedure Laterality Date    DE BREAST AUGMENTATION WITH IMPLANT       History reviewed. No pertinent family history.  Social History     Socioeconomic History    Marital status: Unknown     Spouse name: Not on file    Number of children: Not on file    Years of education: Not on file    Highest education level: Not on file   Occupational History    Not on file   Tobacco Use    Smoking status: Never    Smokeless tobacco: Never   Substance and Sexual Activity    Alcohol use: Yes    Drug use: Never    Sexual activity: Not on file   Other Topics Concern    Not on file   Social History Narrative    Not on file     Social Determinants of Health     Financial Resource Strain: Not on file   Food Insecurity: Not on file   Transportation Needs: Not on file   Physical Activity: Not on file   Stress: Not on file   Social Connections: Not on file   Intimate Partner Violence: Not on file   Housing Stability: Not on file     No Known Allergies  Outpatient  "Encounter Medications as of 6/6/2024   Medication Sig Dispense Refill    amphetamine-dextroamphetamine (ADDERALL) 10 MG Tab Take 10 mg by mouth 3 times a day.      valacyclovir (VALTREX) 1 GM Tab       [DISCONTINUED] propranolol (INDERAL) 10 MG Tab Take 1 Tablet by mouth 2 times a day. 60 Tablet 11     No facility-administered encounter medications on file as of 6/6/2024.     Review of Systems   Constitutional: Negative.    HENT: Negative.     Eyes: Negative.    Respiratory:  Negative for shortness of breath.    Cardiovascular:  Positive for palpitations. Negative for chest pain, orthopnea, leg swelling and PND.   Gastrointestinal: Negative.    Genitourinary: Negative.    Musculoskeletal: Negative.    Skin: Negative.    Neurological:  Positive for headaches.   Endo/Heme/Allergies: Negative.    Psychiatric/Behavioral:  Negative for depression. The patient is not nervous/anxious.               Objective     /62 (BP Location: Left arm, Patient Position: Sitting, BP Cuff Size: Adult)   Pulse 71   Resp 16   Ht 1.702 m (5' 7\")   Wt 60.3 kg (133 lb)   SpO2 97%   BMI 20.83 kg/m²     Physical Exam  Constitutional:       Appearance: Normal appearance.   HENT:      Head: Normocephalic and atraumatic.   Neck:      Vascular: No JVD.   Cardiovascular:      Rate and Rhythm: Normal rate and regular rhythm.      Pulses: Normal pulses.      Heart sounds: Normal heart sounds. No murmur heard.     No friction rub.   Pulmonary:      Effort: Pulmonary effort is normal. No respiratory distress.      Breath sounds: Normal breath sounds.   Abdominal:      General: There is no distension.      Palpations: Abdomen is soft.   Musculoskeletal:      Right lower leg: No edema.      Left lower leg: No edema.   Skin:     General: Skin is warm and dry.   Neurological:      General: No focal deficit present.      Mental Status: She is alert and oriented to person, place, and time.   Psychiatric:         Mood and Affect: Mood normal.       "   Behavior: Behavior normal.            Lab Results   Component Value Date/Time    CHOLSTRLTOT 212 (H) 02/28/2024 09:00 PM    CHOLSTRLTOT 186 01/16/2017 07:00 AM    LDL 90 01/16/2017 07:00 AM    HDL 91 02/28/2024 09:00 PM    HDL 79 01/16/2017 07:00 AM    TRIGLYCERIDE 84 02/28/2024 09:00 PM    TRIGLYCERIDE 83 01/16/2017 07:00 AM       Lab Results   Component Value Date/Time    SODIUM 138 02/28/2024 09:00 PM    SODIUM 138 01/16/2017 07:00 AM    POTASSIUM CANCELED 02/28/2024 09:00 PM    POTASSIUM 3.8 01/16/2017 07:00 AM    CHLORIDE 102 02/28/2024 09:00 PM    CHLORIDE 105 01/16/2017 07:00 AM    CO2 17 (L) 02/28/2024 09:00 PM    CO2 26 01/16/2017 07:00 AM    GLUCOSE CANCELED 02/28/2024 09:00 PM    GLUCOSE 89 01/16/2017 07:00 AM    BUN 13 02/28/2024 09:00 PM    BUN 12 01/16/2017 07:00 AM    CREATININE 0.77 02/28/2024 09:00 PM    CREATININE 0.81 01/16/2017 07:00 AM    BUNCREATRAT 17 02/28/2024 09:00 PM     Lab Results   Component Value Date/Time    ALKPHOSPHAT 55 02/28/2024 09:00 PM    ALKPHOSPHAT 35 01/16/2017 07:00 AM    ASTSGOT 22 02/28/2024 09:00 PM    ASTSGOT 16 01/16/2017 07:00 AM    ALTSGPT 17 02/28/2024 09:00 PM    ALTSGPT 17 01/16/2017 07:00 AM    TBILIRUBIN 0.5 02/28/2024 09:00 PM    TBILIRUBIN 0.7 01/16/2017 07:00 AM      Cardiac event monitor 4/18/2024  ZIO XT REPORT (03/30/24-04/11/24)   Zio study showing predominately sinus rhythm with maximum rate of 165 bpm, minimum rate of 50 bpm, average of 85 bpm.   Atrial fibrillation: None.   Supraventricular tachycardia: 1 episode of supraventricular tachycardia/atrial tachycardia lasting 4 beats with rate of 141 bpm noted.   Pauses: None.   Heart block: None.   Ventricular tachycardia: None.   Rare <1% burden of premature atrial contractions, rare couplets.   Rare <1% burden of premature ventricular contractions, rare couplets.    Ventricular trigeminy (longest episode lasting 9.5 seconds) noted.   Patient events correlated with sinus rhythm, sinus tachycardia,  premature ventricular contractions.     Echocardiogram 5/21/2024  CONCLUSIONS  No prior study is available for comparison.   The ejection fraction is measured to be 56% by Acuña's biplane.  The right ventricle is normal in size and systolic function.  Aneurysmal interatrial septum.  Evidence of early (0-5 beats) right to left shunt suggestive of   intracardiac shunt (ASD or PFO).    Assessment & Plan     1. Palpitations        2. PFO with atrial septal aneurysm            Medical Decision Making: Today's Assessment/Status/Plan:        Palpitations and PFO  -Heart monitor without significant arrhythmia or block  -Less than 1% PAC/PVC burden  -She felt worse with propranolol, we will discontinue this  -She has an echocardiogram coming up on May 21  -Echocardiogram concerning for ASD versus PFO  -We had a long discussion about when PFO closure is appropriate.  Reassurance provided  -Echocardiogram results to be discussed with Dr. Snow when he returns to the office     Follow-up with cardiology as needed     This note was dictated using Dragon speech recognition software.

## 2025-01-07 ENCOUNTER — RESEARCH ENCOUNTER (OUTPATIENT)
Dept: RESEARCH | Facility: MEDICAL CENTER | Age: 50
End: 2025-01-07
Payer: COMMERCIAL

## 2025-01-07 DIAGNOSIS — Z00.6 CLINICAL TRIAL PARTICIPANT: ICD-10-CM

## 2025-01-16 ENCOUNTER — HOSPITAL ENCOUNTER (OUTPATIENT)
Dept: LAB | Facility: MEDICAL CENTER | Age: 50
End: 2025-01-16
Attending: FAMILY MEDICINE
Payer: COMMERCIAL

## 2025-01-16 DIAGNOSIS — Z00.6 CLINICAL TRIAL PARTICIPANT: ICD-10-CM

## 2025-01-22 LAB
ELF SCORE: 9.4 -
HA (HYALURONIC ACID): 54.49 NG/ML
PIIINP (AMINO-TERMINAL PROPEPTIDE): 8.99 NG/ML
RELATIVE RISK: NORMAL
RISK GROUP: NORMAL
RISK: 3.3 %
TIMP-1 (TISSUE INHIBITOR OF MMP1): 192.5 NG/ML

## 2025-01-30 LAB
APOB+LDLR+PCSK9 GENE MUT ANL BLD/T: NOT DETECTED
BRCA1+BRCA2 DEL+DUP + FULL MUT ANL BLD/T: NOT DETECTED
MLH1+MSH2+MSH6+PMS2 GN DEL+DUP+FUL M: NOT DETECTED